# Patient Record
Sex: FEMALE | Race: WHITE
[De-identification: names, ages, dates, MRNs, and addresses within clinical notes are randomized per-mention and may not be internally consistent; named-entity substitution may affect disease eponyms.]

---

## 2018-05-05 ENCOUNTER — HOSPITAL ENCOUNTER (INPATIENT)
Dept: HOSPITAL 25 - MCHOBOUT | Age: 28
LOS: 2 days | Discharge: HOME | DRG: 540 | End: 2018-05-07
Attending: OBSTETRICS & GYNECOLOGY | Admitting: OBSTETRICS & GYNECOLOGY
Payer: COMMERCIAL

## 2018-05-05 DIAGNOSIS — Z3A.38: ICD-10-CM

## 2018-05-05 DIAGNOSIS — D64.9: ICD-10-CM

## 2018-05-05 DIAGNOSIS — O34.211: Primary | ICD-10-CM

## 2018-05-05 LAB
BASOPHILS # BLD AUTO: 0 10^3/UL (ref 0–0.2)
EOSINOPHIL # BLD AUTO: 0 10^3/UL (ref 0–0.6)
HCT VFR BLD AUTO: 32 % (ref 35–47)
HGB BLD-MCNC: 11 G/DL (ref 12–16)
LYMPHOCYTES # BLD AUTO: 2.4 10^3/UL (ref 1–4.8)
MCH RBC QN AUTO: 31 PG (ref 27–31)
MCHC RBC AUTO-ENTMCNC: 34 G/DL (ref 31–36)
MCV RBC AUTO: 90 FL (ref 80–97)
MONOCYTES # BLD AUTO: 0.6 10^3/UL (ref 0–0.8)
NEUTROPHILS # BLD AUTO: 8.6 10^3/UL (ref 1.5–7.7)
NRBC # BLD AUTO: 0 10^3/UL
NRBC BLD QL AUTO: 0
PLATELET # BLD AUTO: 174 10^3/UL (ref 150–450)
RBC # BLD AUTO: 3.57 10^6/UL (ref 4–5.4)
WBC # BLD AUTO: 11.7 10^3/UL (ref 3.5–10.8)

## 2018-05-05 PROCEDURE — 86900 BLOOD TYPING SEROLOGIC ABO: CPT

## 2018-05-05 PROCEDURE — 85025 COMPLETE CBC W/AUTO DIFF WBC: CPT

## 2018-05-05 PROCEDURE — 86850 RBC ANTIBODY SCREEN: CPT

## 2018-05-05 PROCEDURE — 36415 COLL VENOUS BLD VENIPUNCTURE: CPT

## 2018-05-05 PROCEDURE — 86901 BLOOD TYPING SEROLOGIC RH(D): CPT

## 2018-05-06 RX ADMIN — SIMETHICONE CHEW TAB 80 MG SCH MG: 80 TABLET ORAL at 08:36

## 2018-05-06 RX ADMIN — SIMETHICONE CHEW TAB 80 MG SCH MG: 80 TABLET ORAL at 20:32

## 2018-05-06 RX ADMIN — IBUPROFEN SCH MG: 600 TABLET, FILM COATED ORAL at 11:14

## 2018-05-06 RX ADMIN — DOCUSATE SODIUM SCH MG: 100 CAPSULE, LIQUID FILLED ORAL at 13:06

## 2018-05-06 RX ADMIN — DOCUSATE SODIUM SCH MG: 100 CAPSULE, LIQUID FILLED ORAL at 20:32

## 2018-05-06 RX ADMIN — IBUPROFEN SCH MG: 600 TABLET, FILM COATED ORAL at 03:53

## 2018-05-06 RX ADMIN — IBUPROFEN PRN MG: 600 TABLET, FILM COATED ORAL at 20:33

## 2018-05-06 RX ADMIN — SIMETHICONE CHEW TAB 80 MG SCH MG: 80 TABLET ORAL at 18:00

## 2018-05-06 RX ADMIN — DOCUSATE SODIUM SCH MG: 100 CAPSULE, LIQUID FILLED ORAL at 08:36

## 2018-05-06 RX ADMIN — SIMETHICONE CHEW TAB 80 MG SCH MG: 80 TABLET ORAL at 13:06

## 2018-05-06 RX ADMIN — OXYCODONE HYDROCHLORIDE AND ACETAMINOPHEN PRN TAB: 5; 325 TABLET ORAL at 20:33

## 2018-05-06 NOTE — OP
OPERATIVE REPORT:

 

DATE OF OPERATION:  18

 

DATE OF BIRTH:  [birth year]

 

SURGEON:  Elizabeth Mcneill MD

 

ASSISTANT:  Dr. Read

 

ANESTHESIOLOGIST:  Dr. Carpio.

 

PRE-OP DIAGNOSIS:  Intrauterine pregnancy at 38-1/7 weeks, desires repeat 
 section, and spontaneous rupture of membranes.

 

POST-OP DIAGNOSIS:  Intrauterine pregnancy at 38-1/7 weeks, desires repeat 
 section, and spontaneous rupture of membranes, delivered.

 

OPERATIVE PROCEDURE:  Repeat low transverse  section.

 

ESTIMATED BLOOD LOSS:  600 mL.

 

URINE OUTPUT:  400 mL of clear yellow urine.

 

FLUIDS:  2100 mL of crystalloid.

 

FINDINGS:  Revealed a vertex female infant with Apgars 8 at 1 minute, 9 at 5 
minutes.  Weight was 7 pounds 4 ounces.  Nuchal cord x1.  No meconium.  Normal- 
appearing tubes and ovaries bilaterally.  Normal-appearing placenta, manually 
extracted and intact with 3-vessel cord.

 

COMPLICATIONS:  None apparent.

 

DISPOSITION:  Stable to recovery room.

 

DESCRIPTION OF PROCEDURE:  The patient was placed in dorsal lithotomy position. 
The abdomen was prepped and draped in a sterile standard fashion.  The patient 
was identified with the universal protocol for correct the procedure, position, 
and patient, and anesthesia was tested to appropriate level.  An incision was 
made through prior incision using a scalpel and this was carried down through 
to the fascia.  Fascia was scored in the midline and extended laterally and 
superiorly using Rock scissors.  This fascia was  superiorly and 
inferiorly with blunt and sharp dissection.  The peritoneum was entered bluntly 
and the peritoneal incision was extended bluntly.  Bladder blade was inserted.  
Lower uterine segment was identified.  There was no evidence of adhesions.  The 
lower uterine segment was tented up with an Allis.  Incision was made with 
scalpel.  This was carried down through to membranes.  The incision was 
extended laterally and superiorly using bandage scissors.  The infant was 
delivered vertex.  Nuchal cord reduced.  Anterior and posterior shoulder was 
delivered.  The cord was allowed to pulse for greater than 30 seconds.  The 
cord was then clamped and cut and the infant was handed off to waiting 
neonatologist.  Appropriate cord blood was obtained.  Placenta was then 
manually extracted, noted to be intact 3-vessel cord.  The uterine cavity was 
explored with laparotomy sponge and noted to be free of any membranes or 
placental tissue.  The uterus was exteriorized, wrapped in warm moist 
laparotomy sponge and the incision itself was closed, first layer running 
locked 0, second layer running 0 imbricated.  A figure-of-eight was required to 
the left midline hysterotomy site for complete hemostasis.  The uterus was 
returned intraabdominally.  Colic gutters were lavaged.  Hemostasis was 
assured.  The peritoneum was then clamped with Kellys and reapproximated using 3
-0 Vicryl in a running fashion.  Subfascial area was lavaged. Hemostasis 
assured with Bovie coagulation, and the fascia was itself was reapproximated 
using 0 Vicryl x2 in a running fashion.  Subcu was lavaged, hemostasis assured 
with Bovie coagulation, and subcuticular fat stitch was placed using 3-0 
Polysorb in an interrupted fashion.  The skin was then reapproximated using a 4-
0 Monocryl in a subcuticular fashion.  Mastisol and Steris were applied. All 
sponge, needle, instrument, and blade counts were correct throughout the case. 
The patient tolerated the procedure well and went to recovery room in stable 
condition.

 

 [id number]/[facility] #: [id number]

ANIKET

## 2018-05-07 VITALS — DIASTOLIC BLOOD PRESSURE: 57 MMHG | SYSTOLIC BLOOD PRESSURE: 117 MMHG

## 2018-05-07 LAB
BASOPHILS # BLD AUTO: 0 10^3/UL (ref 0–0.2)
EOSINOPHIL # BLD AUTO: 0 10^3/UL (ref 0–0.6)
HCT VFR BLD AUTO: 25 % (ref 35–47)
HGB BLD-MCNC: 8.5 G/DL (ref 12–16)
LYMPHOCYTES # BLD AUTO: 3 10^3/UL (ref 1–4.8)
MCH RBC QN AUTO: 32 PG (ref 27–31)
MCHC RBC AUTO-ENTMCNC: 35 G/DL (ref 31–36)
MCV RBC AUTO: 91 FL (ref 80–97)
MONOCYTES # BLD AUTO: 0.6 10^3/UL (ref 0–0.8)
NEUTROPHILS # BLD AUTO: 8.5 10^3/UL (ref 1.5–7.7)
NRBC # BLD AUTO: 0 10^3/UL
NRBC BLD QL AUTO: 0
PLATELET # BLD AUTO: 153 10^3/UL (ref 150–450)
RBC # BLD AUTO: 2.68 10^6/UL (ref 4–5.4)
WBC # BLD AUTO: 12.2 10^3/UL (ref 3.5–10.8)

## 2018-05-07 RX ADMIN — SIMETHICONE CHEW TAB 80 MG SCH MG: 80 TABLET ORAL at 08:49

## 2018-05-07 RX ADMIN — DOCUSATE SODIUM SCH MG: 100 CAPSULE, LIQUID FILLED ORAL at 13:38

## 2018-05-07 RX ADMIN — OXYCODONE HYDROCHLORIDE AND ACETAMINOPHEN PRN TAB: 5; 325 TABLET ORAL at 03:41

## 2018-05-07 RX ADMIN — IBUPROFEN PRN MG: 600 TABLET, FILM COATED ORAL at 03:41

## 2018-05-07 RX ADMIN — IBUPROFEN PRN MG: 600 TABLET, FILM COATED ORAL at 09:59

## 2018-05-07 RX ADMIN — OXYCODONE HYDROCHLORIDE AND ACETAMINOPHEN PRN TAB: 5; 325 TABLET ORAL at 13:39

## 2018-05-07 RX ADMIN — OXYCODONE HYDROCHLORIDE AND ACETAMINOPHEN PRN TAB: 5; 325 TABLET ORAL at 08:49

## 2018-05-07 RX ADMIN — DOCUSATE SODIUM SCH MG: 100 CAPSULE, LIQUID FILLED ORAL at 08:49

## 2018-05-07 RX ADMIN — SIMETHICONE CHEW TAB 80 MG SCH MG: 80 TABLET ORAL at 13:39
